# Patient Record
Sex: MALE | Race: WHITE | Employment: FULL TIME | URBAN - METROPOLITAN AREA
[De-identification: names, ages, dates, MRNs, and addresses within clinical notes are randomized per-mention and may not be internally consistent; named-entity substitution may affect disease eponyms.]

---

## 2017-03-11 ENCOUNTER — HOSPITAL ENCOUNTER (EMERGENCY)
Age: 27
Discharge: HOME | End: 2017-03-11
Attending: EMERGENCY MEDICINE
Payer: COMMERCIAL

## 2017-03-11 VITALS
HEART RATE: 69 BPM | DIASTOLIC BLOOD PRESSURE: 68 MMHG | WEIGHT: 145 LBS | HEIGHT: 72 IN | RESPIRATION RATE: 16 BRPM | TEMPERATURE: 98.1 F | SYSTOLIC BLOOD PRESSURE: 115 MMHG | BODY MASS INDEX: 19.64 KG/M2 | OXYGEN SATURATION: 98 %

## 2017-03-11 DIAGNOSIS — H92.21 BLOOD IN RIGHT EAR CANAL: Primary | ICD-10-CM

## 2017-03-11 DIAGNOSIS — H92.03 EAR PAIN, BILATERAL: ICD-10-CM

## 2017-03-11 DIAGNOSIS — F17.200 SMOKING ADDICTION: ICD-10-CM

## 2017-03-11 PROCEDURE — 6370000000 HC RX 637 (ALT 250 FOR IP): Performed by: EMERGENCY MEDICINE

## 2017-03-11 PROCEDURE — 99283 EMERGENCY DEPT VISIT LOW MDM: CPT

## 2017-03-11 RX ORDER — NAPROXEN 250 MG/1
500 TABLET ORAL ONCE
Status: COMPLETED | OUTPATIENT
Start: 2017-03-11 | End: 2017-03-11

## 2017-03-11 RX ORDER — NAPROXEN 500 MG/1
500 TABLET ORAL 2 TIMES DAILY WITH MEALS
Qty: 60 TABLET | Refills: 0 | Status: SHIPPED | OUTPATIENT
Start: 2017-03-11 | End: 2018-02-08 | Stop reason: ALTCHOICE

## 2017-03-11 RX ORDER — NEOMYCIN SULFATE, POLYMYXIN B SULFATE AND HYDROCORTISONE 10; 3.5; 1 MG/ML; MG/ML; [USP'U]/ML
3 SUSPENSION/ DROPS AURICULAR (OTIC) EVERY 6 HOURS SCHEDULED
Status: DISCONTINUED | OUTPATIENT
Start: 2017-03-11 | End: 2017-03-11 | Stop reason: HOSPADM

## 2017-03-11 RX ADMIN — NAPROXEN 500 MG: 250 TABLET ORAL at 04:31

## 2017-03-11 RX ADMIN — NEOMYCIN SULFATE, POLYMYXIN B SULFATE AND HYDROCORTISONE 3 DROP: 10; 3.5; 1 SUSPENSION/ DROPS AURICULAR (OTIC) at 04:31

## 2017-03-11 ASSESSMENT — PAIN DESCRIPTION - PAIN TYPE: TYPE: ACUTE PAIN

## 2017-03-11 ASSESSMENT — PAIN SCALES - GENERAL
PAINLEVEL_OUTOF10: 6
PAINLEVEL_OUTOF10: 6

## 2017-03-11 ASSESSMENT — ENCOUNTER SYMPTOMS
ABDOMINAL PAIN: 0
BACK PAIN: 0
VOMITING: 0
SORE THROAT: 0
SHORTNESS OF BREATH: 0
NAUSEA: 0
DIARRHEA: 0
EYE PAIN: 0
COUGH: 0

## 2017-03-11 ASSESSMENT — PAIN DESCRIPTION - LOCATION: LOCATION: EAR

## 2017-03-11 ASSESSMENT — PAIN DESCRIPTION - ORIENTATION: ORIENTATION: RIGHT

## 2017-03-11 ASSESSMENT — PAIN DESCRIPTION - DESCRIPTORS: DESCRIPTORS: SHARP

## 2018-02-08 ENCOUNTER — HOSPITAL ENCOUNTER (EMERGENCY)
Age: 28
Discharge: HOME | End: 2018-02-08
Attending: EMERGENCY MEDICINE
Payer: COMMERCIAL

## 2018-02-08 VITALS
HEIGHT: 72 IN | BODY MASS INDEX: 21.4 KG/M2 | SYSTOLIC BLOOD PRESSURE: 122 MMHG | DIASTOLIC BLOOD PRESSURE: 71 MMHG | OXYGEN SATURATION: 100 % | RESPIRATION RATE: 14 BRPM | WEIGHT: 158 LBS | TEMPERATURE: 97.8 F | HEART RATE: 75 BPM

## 2018-02-08 DIAGNOSIS — M54.32 SCIATICA OF LEFT SIDE: Primary | ICD-10-CM

## 2018-02-08 DIAGNOSIS — S39.012A STRAIN OF LUMBAR REGION, INITIAL ENCOUNTER: ICD-10-CM

## 2018-02-08 PROCEDURE — 6370000000 HC RX 637 (ALT 250 FOR IP): Performed by: PHYSICIAN ASSISTANT

## 2018-02-08 PROCEDURE — 99283 EMERGENCY DEPT VISIT LOW MDM: CPT

## 2018-02-08 RX ORDER — CYCLOBENZAPRINE HCL 10 MG
10 TABLET ORAL 2 TIMES DAILY PRN
Qty: 10 TABLET | Refills: 0 | Status: SHIPPED | OUTPATIENT
Start: 2018-02-08 | End: 2018-02-13

## 2018-02-08 RX ORDER — IBUPROFEN 600 MG/1
600 TABLET ORAL EVERY 6 HOURS PRN
Qty: 30 TABLET | Refills: 0 | Status: SHIPPED | OUTPATIENT
Start: 2018-02-08 | End: 2018-03-06 | Stop reason: ALTCHOICE

## 2018-02-08 RX ORDER — IBUPROFEN 600 MG/1
600 TABLET ORAL ONCE
Status: COMPLETED | OUTPATIENT
Start: 2018-02-08 | End: 2018-02-08

## 2018-02-08 RX ADMIN — IBUPROFEN 600 MG: 600 TABLET ORAL at 13:41

## 2018-02-08 ASSESSMENT — PAIN SCALES - GENERAL
PAINLEVEL_OUTOF10: 7
PAINLEVEL_OUTOF10: 7

## 2018-02-08 ASSESSMENT — PAIN DESCRIPTION - ORIENTATION: ORIENTATION: LEFT

## 2018-02-08 ASSESSMENT — ENCOUNTER SYMPTOMS
BACK PAIN: 1
BOWEL INCONTINENCE: 0
ABDOMINAL SWELLING: 0
ABDOMINAL PAIN: 0

## 2018-02-08 ASSESSMENT — PAIN DESCRIPTION - PAIN TYPE: TYPE: ACUTE PAIN

## 2018-02-08 ASSESSMENT — PAIN DESCRIPTION - LOCATION: LOCATION: BACK

## 2018-03-06 ENCOUNTER — HOSPITAL ENCOUNTER (EMERGENCY)
Age: 28
Discharge: HOME | End: 2018-03-06
Attending: EMERGENCY MEDICINE
Payer: COMMERCIAL

## 2018-03-06 VITALS
TEMPERATURE: 98.1 F | DIASTOLIC BLOOD PRESSURE: 74 MMHG | HEART RATE: 52 BPM | SYSTOLIC BLOOD PRESSURE: 116 MMHG | RESPIRATION RATE: 14 BRPM | HEIGHT: 72 IN | BODY MASS INDEX: 22.35 KG/M2 | OXYGEN SATURATION: 97 % | WEIGHT: 165 LBS

## 2018-03-06 DIAGNOSIS — B34.9 VIRAL SYNDROME: Primary | ICD-10-CM

## 2018-03-06 LAB
ABSOLUTE EOS #: 0.08 K/UL (ref 0–0.4)
ABSOLUTE IMMATURE GRANULOCYTE: ABNORMAL K/UL (ref 0–0.3)
ABSOLUTE LYMPH #: 1.54 K/UL (ref 1–4.8)
ABSOLUTE MONO #: 0.54 K/UL (ref 0.1–1.3)
ANION GAP SERPL CALCULATED.3IONS-SCNC: 13 MMOL/L (ref 9–17)
BASOPHILS # BLD: 0 % (ref 0–2)
BASOPHILS ABSOLUTE: 0 K/UL (ref 0–0.2)
BUN BLDV-MCNC: 12 MG/DL (ref 6–20)
BUN/CREAT BLD: ABNORMAL (ref 9–20)
CALCIUM SERPL-MCNC: 9.8 MG/DL (ref 8.6–10.4)
CHLORIDE BLD-SCNC: 104 MMOL/L (ref 98–107)
CO2: 23 MMOL/L (ref 20–31)
CREAT SERPL-MCNC: 1.03 MG/DL (ref 0.7–1.2)
DIFFERENTIAL TYPE: ABNORMAL
DIRECT EXAM: NORMAL
EOSINOPHILS RELATIVE PERCENT: 1 % (ref 0–4)
GFR AFRICAN AMERICAN: >60 ML/MIN
GFR NON-AFRICAN AMERICAN: >60 ML/MIN
GFR SERPL CREATININE-BSD FRML MDRD: ABNORMAL ML/MIN/{1.73_M2}
GFR SERPL CREATININE-BSD FRML MDRD: ABNORMAL ML/MIN/{1.73_M2}
GLUCOSE BLD-MCNC: 103 MG/DL (ref 70–99)
HCT VFR BLD CALC: 46.4 % (ref 41–53)
HEMOGLOBIN: 16.1 G/DL (ref 13.5–17.5)
IMMATURE GRANULOCYTES: ABNORMAL %
LYMPHOCYTES # BLD: 20 % (ref 24–44)
Lab: NORMAL
MCH RBC QN AUTO: 32.3 PG (ref 26–34)
MCHC RBC AUTO-ENTMCNC: 34.7 G/DL (ref 31–37)
MCV RBC AUTO: 93 FL (ref 80–100)
MONOCYTES # BLD: 7 % (ref 1–7)
MORPHOLOGY: NORMAL
NRBC AUTOMATED: ABNORMAL PER 100 WBC
PDW BLD-RTO: 13 % (ref 11.5–14.9)
PLATELET # BLD: 210 K/UL (ref 150–450)
PLATELET ESTIMATE: ABNORMAL
PMV BLD AUTO: 8.9 FL (ref 6–12)
POTASSIUM SERPL-SCNC: 4.1 MMOL/L (ref 3.7–5.3)
RBC # BLD: 4.99 M/UL (ref 4.5–5.9)
RBC # BLD: ABNORMAL 10*6/UL
SEG NEUTROPHILS: 72 % (ref 36–66)
SEGMENTED NEUTROPHILS ABSOLUTE COUNT: 5.54 K/UL (ref 1.3–9.1)
SODIUM BLD-SCNC: 140 MMOL/L (ref 135–144)
SPECIMEN DESCRIPTION: NORMAL
SPECIMEN DESCRIPTION: NORMAL
STATUS: NORMAL
WBC # BLD: 7.7 K/UL (ref 3.5–11)
WBC # BLD: ABNORMAL 10*3/UL

## 2018-03-06 PROCEDURE — 80048 BASIC METABOLIC PNL TOTAL CA: CPT

## 2018-03-06 PROCEDURE — 2580000003 HC RX 258: Performed by: EMERGENCY MEDICINE

## 2018-03-06 PROCEDURE — 6360000002 HC RX W HCPCS: Performed by: EMERGENCY MEDICINE

## 2018-03-06 PROCEDURE — 96374 THER/PROPH/DIAG INJ IV PUSH: CPT

## 2018-03-06 PROCEDURE — 85025 COMPLETE CBC W/AUTO DIFF WBC: CPT

## 2018-03-06 PROCEDURE — 99284 EMERGENCY DEPT VISIT MOD MDM: CPT

## 2018-03-06 PROCEDURE — 87804 INFLUENZA ASSAY W/OPTIC: CPT

## 2018-03-06 PROCEDURE — 36415 COLL VENOUS BLD VENIPUNCTURE: CPT

## 2018-03-06 RX ORDER — 0.9 % SODIUM CHLORIDE 0.9 %
1000 INTRAVENOUS SOLUTION INTRAVENOUS ONCE
Status: COMPLETED | OUTPATIENT
Start: 2018-03-06 | End: 2018-03-06

## 2018-03-06 RX ORDER — IBUPROFEN 600 MG/1
600 TABLET ORAL EVERY 6 HOURS PRN
Qty: 40 TABLET | Refills: 0 | Status: SHIPPED | OUTPATIENT
Start: 2018-03-06 | End: 2018-07-29 | Stop reason: DRUGHIGH

## 2018-03-06 RX ORDER — KETOROLAC TROMETHAMINE 30 MG/ML
30 INJECTION, SOLUTION INTRAMUSCULAR; INTRAVENOUS ONCE
Status: COMPLETED | OUTPATIENT
Start: 2018-03-06 | End: 2018-03-06

## 2018-03-06 RX ADMIN — SODIUM CHLORIDE 1000 ML: 9 INJECTION, SOLUTION INTRAVENOUS at 10:30

## 2018-03-06 RX ADMIN — KETOROLAC TROMETHAMINE 30 MG: 30 INJECTION, SOLUTION INTRAMUSCULAR; INTRAVENOUS at 10:30

## 2018-03-06 ASSESSMENT — PAIN DESCRIPTION - ONSET: ONSET: SUDDEN

## 2018-03-06 ASSESSMENT — PAIN SCALES - GENERAL
PAINLEVEL_OUTOF10: 5
PAINLEVEL_OUTOF10: 3

## 2018-03-06 ASSESSMENT — PAIN DESCRIPTION - LOCATION: LOCATION: GENERALIZED

## 2018-03-06 ASSESSMENT — PAIN DESCRIPTION - DESCRIPTORS: DESCRIPTORS: ACHING

## 2018-03-06 NOTE — ED PROVIDER NOTES
[address]  eMERGENCY dEPARTMENT eNCOUnter    Pt Name: Emily Yepez  MRN: [id number]  [place] 1990  Date of evaluation: 3/6/18  CHIEF COMPLAINT       Chief Complaint   Patient presents with    Emesis    Chills    Headache    Generalized Body Aches     HISTORY OF PRESENT ILLNESS   HPI  18-year-old male with past medical history as listed below presents the ER with multiple complaints. Patient states earlier today he felt warm and had chills followed by body aches and a mild throbbing headache. Patient states he threw up twice. Vomitus described as nonbilious nonbloody. Patient denies chest pain, rash, neck pain, cough or runny nose. He positive for sick contacts. REVIEW OF SYSTEMS     Review of Systems   All other systems reviewed and are negative. PAST MEDICAL HISTORY     Past Medical History:   Diagnosis Date    Headache(784.0)     Syncope      SURGICAL HISTORY       Past Surgical History:   Procedure Laterality Date    APPENDECTOMY      HERNIA REPAIR       CURRENT MEDICATIONS       Discharge Medication List as of 3/6/2018 11:42 AM        ALLERGIES     has No Known Allergies. FAMILY HISTORY     indicated that the status of his mother is unknown. He indicated that the status of his father is unknown. He indicated that the status of his maternal grandmother is unknown. He indicated that the status of his maternal grandfather is unknown. He indicated that the status of his paternal grandmother is unknown. He indicated that the status of his paternal grandfather is unknown. SOCIAL HISTORY      reports that he has been smoking. He has a 7.50 pack-year smoking history. He has never used smokeless tobacco. He reports that he drinks alcohol. He reports that he uses drugs, including Marijuana.   PHYSICAL EXAM     INITIAL VITALS: /74   Pulse 52   Temp 98.1 °F (36.7 °C) (Oral)   Resp 14   Ht 6' (1.829 m)   Wt 165 lb (74.8 kg)   SpO2 97%   BMI 22.38 kg/m²    Physical Exam

## 2018-07-29 ENCOUNTER — HOSPITAL ENCOUNTER (EMERGENCY)
Age: 28
Discharge: HOME | End: 2018-07-29
Attending: EMERGENCY MEDICINE
Payer: COMMERCIAL

## 2018-07-29 VITALS
WEIGHT: 160 LBS | TEMPERATURE: 97.5 F | DIASTOLIC BLOOD PRESSURE: 71 MMHG | OXYGEN SATURATION: 99 % | RESPIRATION RATE: 14 BRPM | HEART RATE: 87 BPM | HEIGHT: 72 IN | BODY MASS INDEX: 21.67 KG/M2 | SYSTOLIC BLOOD PRESSURE: 103 MMHG

## 2018-07-29 DIAGNOSIS — M54.2 NECK PAIN: Primary | ICD-10-CM

## 2018-07-29 DIAGNOSIS — K08.89 PAIN, DENTAL: ICD-10-CM

## 2018-07-29 PROCEDURE — 6370000000 HC RX 637 (ALT 250 FOR IP): Performed by: EMERGENCY MEDICINE

## 2018-07-29 PROCEDURE — 99282 EMERGENCY DEPT VISIT SF MDM: CPT

## 2018-07-29 RX ORDER — IBUPROFEN 600 MG/1
600 TABLET ORAL EVERY 6 HOURS PRN
Qty: 30 TABLET | Refills: 0 | Status: SHIPPED | OUTPATIENT
Start: 2018-07-29

## 2018-07-29 RX ORDER — DIAZEPAM 5 MG/1
5 TABLET ORAL EVERY 8 HOURS PRN
Qty: 10 TABLET | Refills: 0 | Status: SHIPPED | OUTPATIENT
Start: 2018-07-29 | End: 2018-07-29 | Stop reason: DRUGHIGH

## 2018-07-29 RX ORDER — IBUPROFEN 800 MG/1
800 TABLET ORAL ONCE
Status: COMPLETED | OUTPATIENT
Start: 2018-07-29 | End: 2018-07-29

## 2018-07-29 RX ORDER — PENICILLIN V POTASSIUM 250 MG/1
500 TABLET ORAL ONCE
Status: COMPLETED | OUTPATIENT
Start: 2018-07-29 | End: 2018-07-29

## 2018-07-29 RX ORDER — PENICILLIN V POTASSIUM 500 MG/1
500 TABLET ORAL 4 TIMES DAILY
Qty: 28 TABLET | Refills: 0 | Status: SHIPPED | OUTPATIENT
Start: 2018-07-29 | End: 2018-08-05

## 2018-07-29 RX ORDER — CHLORHEXIDINE GLUCONATE 0.12 MG/ML
15 RINSE ORAL 2 TIMES DAILY
Qty: 420 ML | Refills: 0 | Status: SHIPPED | OUTPATIENT
Start: 2018-07-29 | End: 2018-08-12

## 2018-07-29 RX ORDER — ACETAMINOPHEN 325 MG/1
650 TABLET ORAL EVERY 8 HOURS PRN
Qty: 30 TABLET | Refills: 0 | Status: SHIPPED | OUTPATIENT
Start: 2018-07-29

## 2018-07-29 RX ORDER — ACETAMINOPHEN 325 MG/1
650 TABLET ORAL ONCE
Status: COMPLETED | OUTPATIENT
Start: 2018-07-29 | End: 2018-07-29

## 2018-07-29 RX ADMIN — PENICILLIN V POTASIUM 500 MG: 250 TABLET ORAL at 17:45

## 2018-07-29 RX ADMIN — ACETAMINOPHEN 650 MG: 325 TABLET ORAL at 16:55

## 2018-07-29 RX ADMIN — IBUPROFEN 800 MG: 800 TABLET, FILM COATED ORAL at 16:55

## 2018-07-29 ASSESSMENT — PAIN SCALES - GENERAL: PAINLEVEL_OUTOF10: 10

## 2018-07-29 NOTE — ED PROVIDER NOTES
I performed a history and physical examination of the patient and discussed management with the resident. I reviewed the residents note and agree with the documented findings and plan of care. Any areas of disagreement are noted on the chart. I was personally present for the key portions of any procedures. I have documented in the chart those procedures where I was not present during the key portions. I have reviewed the emergency nurses triage note. I agree with the chief complaint, past medical history, past surgical history, allergies, medications, social and family history as documented unless otherwise noted below. Documentation of the HPI, Physical Exam and Medical Decision Making performed by medical students or scribes is based on my personal performance of the HPI, PE and MDM. For Phys Assistant/ Nurse Practitioner cases/documentation I have personally evaluated this patient and have completed at least one if not all key elements of the E/M (history, physical exam, and MDM). I find the patient's history and physical exam are consistent with the NP/PA documentation. I agree with the care provided, treatment rendered, disposition and followup plan. Additional findings are as noted. Macye Couch. Claudia Israel MD  Attending Emergency  Physician      C/O SEV DAY HX OF LEFT SIDED NECK PAIN, JAW PAIN, RADIATION TO LEFT EAR, WORSE WITH CHEWING, /OPENING/CLOSING MOUTH. NO FEVER, CHILLS, TRAUMA, DIZZINESS/VERTIGO, DIST HEARING, HEADACHE. AWAKE, ALERT, COOP, RESPONSIVE. NECK-TENDER, MILDLY ENLARGED LEFT SUBMANDIBULAR NODE. NO ERYTHEMA, WARMTH. EAC'S/TM'S NORMAL SERENITY. ORALCAV-EXTREMELY CARIOUS, TENDER LEFT MAXILLARY SECOND MOLAR. MILD GINGIVAL SWELLING AT BASE OF TOOTH. NO SWELLING, FLUCTUANCE, POINTING. IMP-DENTAL CARIES, PROB EARLY PERIAPICAL ABSCESS. PLAN-DISCHARGE, RX PCN, IBUPROFEN. F/U WITH YOUR DENTIST ASAP-CALL IN AM. RETURN IF SX WORSEN OR PROGRESS.           Marguerite Cardoso MD  07/29/18 3646

## 2018-07-29 NOTE — ED NOTES
Pt to ed c/o left sided ear pain, left sided facial pain & left sided neck pain. Pt states this started yesterday but didn't think much of it. However, today the pain has gotten progressively worse where it hurts to talk, eat, and smoke. Pt is alert and orientedx3, will continue to monitor.       Brittany Hernandez RN  07/29/18 9919

## 2018-07-29 NOTE — ED PROVIDER NOTES
Mother     Anxiety Disorder Father     Diabetes Maternal Grandmother     Diabetes Maternal Grandfather     Heart Disease Maternal Grandfather     Arthritis Maternal Grandfather     Diabetes Paternal Grandmother     Diabetes Paternal Grandfather        Allergies:  Patient has no known allergies. Home Medications:  Prior to Admission medications    Medication Sig Start Date End Date Taking? Authorizing Provider   acetaminophen (TYLENOL) 325 MG tablet Take 2 tablets by mouth every 8 hours as needed for Pain 7/29/18  Yes Tiffanie Meyer MD   ibuprofen (ADVIL;MOTRIN) 600 MG tablet Take 1 tablet by mouth every 6 hours as needed for Pain 7/29/18  Yes Tiffanie Meyer MD   penicillin v potassium (VEETID) 500 MG tablet Take 1 tablet by mouth 4 times daily for 7 days 7/29/18 8/5/18 Yes Tiffanie Meyer MD   chlorhexidine (PERIDEX) 0.12 % solution Take 15 mLs by mouth 2 times daily for 14 days 7/29/18 8/12/18 Yes Tiffanie Meyer MD       REVIEW OF SYSTEMS    (2-9 systems for level 4, 10 or more for level 5)        ROS:  Constitutional: Denied fever/chills, weight loss  Eyes: Denied change in vision, eye pain  ENT: Positive sinus problems, congestion/obstruction  GI: Denies nausea, vomiting,    Neuro: Denies weekness and seizures    PHYSICAL EXAM   (up to 7 for level 4, 8 or more for level 5)      INITIAL VITALS:   /71   Pulse 87   Temp 97.5 °F (36.4 °C) (Oral)   Resp 14   Ht 6' (1.829 m)   Wt 160 lb (72.6 kg)   SpO2 99%   BMI 21.70 kg/m²     Vital signs reviewed. Nursing note reviewed    CONSTITUTIONAL: Vital signs reviewed, Alert and oriented X 3. No acute distress  HEAD: Atraumatic, Normocephalic. TM Clear bilaterally, mastoid non tender and without bulging ears there is ttp of a left LAD which is <1 cm submandibular  EYES: Eyes are normal to inspection, Pupils equal, round and reactive to light. NECK: Normal ROM, No jugular venous distention, No meningeal signs, neck supple, Cervical spine nontender. 420 mL     Refill:  0    penicillin v potassium (VEETID) tablet 500 mg       DDX: dental cavitis, tooth fracture or fracture, tooth abscess, epiglottitis, kit's angina, retropharyngeal abscess/cellulitis, parapharyngeal abscess, peritonsillar abscess, mononucleosis, carotid dissection/aneurysm, alveolar osteitis (dry socket), pharyngitis, URI, foreign body aspiration or ingestion, trauma, post-extraction pain, TMJ pain, aphthous ulcer, herpes, acute necrotizing ulcerative gingivitis      DIAGNOSTIC RESULTS / EMERGENCY DEPARTMENT COURSE / MDM     IMPRESSION: Dental pain. EMERGENCY DEPARTMENT COURSE:  Control pain  Antibiotics   Dental Follow up with his dentist  3 min. smoking cessation given to the patient      FINAL IMPRESSION      1. Neck pain    2. Pain, dental          Pain meds, chlorhexidine rinses and antibiotic prescriptions via pcn 500 mg q 8 hrs   Patient presents with complaints of dental pain. At this time there is no dental abscesses to drain. There is multiple dental caries. Patient has been tolerating secretions. Patient has been speaking full sentences. Patient is nontoxic looking and can be safely discharged home. Low Suspicion for retropharyngeal abscess, Kit angina, peritonsillar abscess. Patient is currently afebrile  Instructions as noted below  DISPOSITION / PLAN     DISPOSITION Decision To Discharge 07/29/2018 05:16:09 PM      Call today or tomorrow for a follow up with your dentist tomorrow, [address] [facility] or [facility] [place] at [phone] or one of the dentists provided for follow up. Take your medication as indicated, if you are given an antibiotic then make sure you get the prescription filled and take the antibiotics until finished. Drink plenty of water while taking the antibiotics. Avoid drinking alcohol while taking antibiotics.      Sarah Phelps has some antibiotics for free; [facility] and Tommie Klinefelter has a 4 dollar

## 2020-01-10 ENCOUNTER — HOSPITAL ENCOUNTER (EMERGENCY)
Age: 30
Discharge: HOME | End: 2020-01-10
Attending: EMERGENCY MEDICINE
Payer: COMMERCIAL

## 2020-01-10 VITALS
RESPIRATION RATE: 14 BRPM | DIASTOLIC BLOOD PRESSURE: 71 MMHG | WEIGHT: 157 LBS | HEIGHT: 72 IN | HEART RATE: 88 BPM | BODY MASS INDEX: 21.26 KG/M2 | OXYGEN SATURATION: 98 % | TEMPERATURE: 97.9 F | SYSTOLIC BLOOD PRESSURE: 114 MMHG

## 2020-01-10 PROCEDURE — 99281 EMR DPT VST MAYX REQ PHY/QHP: CPT

## 2020-01-10 PROCEDURE — 6370000000 HC RX 637 (ALT 250 FOR IP): Performed by: PHYSICIAN ASSISTANT

## 2020-01-10 RX ORDER — ONDANSETRON 4 MG/1
4 TABLET, ORALLY DISINTEGRATING ORAL EVERY 8 HOURS PRN
Qty: 20 TABLET | Refills: 0 | Status: SHIPPED | OUTPATIENT
Start: 2020-01-10

## 2020-01-10 RX ORDER — ONDANSETRON 4 MG/1
4 TABLET, ORALLY DISINTEGRATING ORAL ONCE
Status: COMPLETED | OUTPATIENT
Start: 2020-01-10 | End: 2020-01-10

## 2020-01-10 RX ORDER — DICYCLOMINE HYDROCHLORIDE 10 MG/1
10 CAPSULE ORAL EVERY 6 HOURS PRN
Qty: 20 CAPSULE | Refills: 0 | Status: SHIPPED | OUTPATIENT
Start: 2020-01-10

## 2020-01-10 RX ADMIN — ONDANSETRON 4 MG: 4 TABLET, ORALLY DISINTEGRATING ORAL at 17:57

## 2020-01-10 NOTE — ED PROVIDER NOTES
[address]  eMERGENCY dEPARTMENT eNCOUnter   Independent Attestation     Pt Name: Marianne Castillo  MRN: [id number]  [place] 1990  Date of evaluation: 1/10/20   Marianne Castillo is a 30 y.o. male who presents with Letter for School/Work    Vitals:   Vitals:    01/10/20 1738   BP: 114/71   Pulse: 88   Resp: 14   Temp: 97.9 °F (36.6 °C)   TempSrc: Temporal   SpO2: 98%   Weight: 157 lb (71.2 kg)   Height: 6' (1.829 m)     Impression:   1. Gastroenteritis      I was personally available for consultation in the Emergency Department. I have reviewed the chart and agree with the documentation as recorded by the [facility], including the assessment, treatment plan and disposition.   Sol Rogers MD  Attending Emergency  Physician                  Sol Rogers MD  01/10/20 1817       Sol Rogers MD  01/11/20 1550
capsule by mouth every 6 hours as needed (cramps)     Dispense:  20 capsule     Refill:  0       CONSULTS:  None      FINAL IMPRESSION      1.  Gastroenteritis          DISPOSITION/PLAN:  DISPOSITION Decision To Discharge      PATIENT REFERRED TO:  your doctor or clinic list    Schedule an appointment as soon as possible for a visit       [facility] ED  [address]  [phone]    For worsening symptoms, or any other concern      DISCHARGE MEDICATIONS:  Discharge Medication List as of 1/10/2020  6:16 PM      START taking these medications    Details   ondansetron (ZOFRAN ODT) 4 MG disintegrating tablet Take 1 tablet by mouth every 8 hours as needed for Nausea, Disp-20 tablet, R-0Print      dicyclomine (BENTYL) 10 MG capsule Take 1 capsule by mouth every 6 hours as needed (cramps), Disp-20 capsule, R-0Print             (Please note that portions of this note were completed with a voice recognition program.  Efforts were made to edit the dictations but occasionally words are mis-transcribed.)    Ivy Harman, [address] [place] [address]  01/10/20 0354

## 2020-02-09 ENCOUNTER — HOSPITAL ENCOUNTER (EMERGENCY)
Age: 30
Discharge: HOME | End: 2020-02-09
Attending: EMERGENCY MEDICINE
Payer: COMMERCIAL

## 2020-02-09 ENCOUNTER — APPOINTMENT (OUTPATIENT)
Dept: GENERAL RADIOLOGY | Age: 30
End: 2020-02-09
Payer: COMMERCIAL

## 2020-02-09 VITALS
HEIGHT: 72 IN | RESPIRATION RATE: 14 BRPM | WEIGHT: 155 LBS | SYSTOLIC BLOOD PRESSURE: 137 MMHG | DIASTOLIC BLOOD PRESSURE: 90 MMHG | HEART RATE: 84 BPM | OXYGEN SATURATION: 99 % | BODY MASS INDEX: 20.99 KG/M2 | TEMPERATURE: 98.4 F

## 2020-02-09 PROCEDURE — 73110 X-RAY EXAM OF WRIST: CPT

## 2020-02-09 PROCEDURE — 6370000000 HC RX 637 (ALT 250 FOR IP): Performed by: PHYSICIAN ASSISTANT

## 2020-02-09 PROCEDURE — 99283 EMERGENCY DEPT VISIT LOW MDM: CPT

## 2020-02-09 RX ORDER — IBUPROFEN 600 MG/1
600 TABLET ORAL ONCE
Status: COMPLETED | OUTPATIENT
Start: 2020-02-09 | End: 2020-02-09

## 2020-02-09 RX ORDER — HYDROCODONE BITARTRATE AND ACETAMINOPHEN 5; 325 MG/1; MG/1
1 TABLET ORAL ONCE
Status: COMPLETED | OUTPATIENT
Start: 2020-02-09 | End: 2020-02-09

## 2020-02-09 RX ORDER — IBUPROFEN 600 MG/1
600 TABLET ORAL EVERY 6 HOURS PRN
Qty: 30 TABLET | Refills: 0 | Status: SHIPPED | OUTPATIENT
Start: 2020-02-09

## 2020-02-09 RX ADMIN — IBUPROFEN 600 MG: 600 TABLET, FILM COATED ORAL at 18:57

## 2020-02-09 RX ADMIN — HYDROCODONE BITARTRATE AND ACETAMINOPHEN 1 TABLET: 5; 325 TABLET ORAL at 20:11

## 2020-02-09 ASSESSMENT — PAIN DESCRIPTION - ORIENTATION: ORIENTATION: RIGHT

## 2020-02-09 ASSESSMENT — PAIN DESCRIPTION - LOCATION: LOCATION: WRIST

## 2020-02-09 ASSESSMENT — PAIN SCALES - GENERAL
PAINLEVEL_OUTOF10: 6

## 2020-02-09 ASSESSMENT — PAIN DESCRIPTION - DESCRIPTORS: DESCRIPTORS: ACHING

## 2020-02-09 ASSESSMENT — PAIN DESCRIPTION - FREQUENCY: FREQUENCY: CONTINUOUS

## 2020-02-09 ASSESSMENT — PAIN DESCRIPTION - PAIN TYPE: TYPE: ACUTE PAIN

## 2020-02-09 NOTE — ED PROVIDER NOTES
[address]  eMERGENCY dEPARTMENT eNCOUnter      Pt Name: Angie Granados  MRN: [id number]  [place] 1990  Date of evaluation: 2/9/20      CHIEF COMPLAINT       Chief Complaint   Patient presents with    Wrist Pain     Friday morning awoke to pain         HISTORY OF PRESENT ILLNESS    Angie Granados is a 30 y.o. male who presents complaining of right wrist pain woke up with it Friday. The history is provided by the patient. Wrist Problem   Location:  Wrist (He woke up with pain in the right wrist no recent injury or trauma but he did have a scaphoid fracture in the past that he did not take care of properly he states he cut the cast off before the wrist was healed.)  Wrist location:  R wrist  Injury: no    Pain details:     Quality:  Aching    Radiates to:  Does not radiate    Severity:  Mild    Onset quality:  Sudden    Duration:  2 days    Timing:  Intermittent  Dislocation: no    Foreign body present:  No foreign bodies  Tetanus status:  Unknown  Prior injury to area:  Yes  Relieved by:  Nothing  Worsened by: Movement      REVIEW OF SYSTEMS       Review of Systems   Musculoskeletal: Positive for arthralgias (right medial wrist pain no recent trauma). All other systems reviewed and are negative.       PAST MEDICAL HISTORY     Past Medical History:   Diagnosis Date    Headache(784.0)     Syncope        SURGICAL HISTORY       Past Surgical History:   Procedure Laterality Date    APPENDECTOMY      HERNIA REPAIR         CURRENT MEDICATIONS       Discharge Medication List as of 2/9/2020  8:08 PM      CONTINUE these medications which have NOT CHANGED    Details   ondansetron (ZOFRAN ODT) 4 MG disintegrating tablet Take 1 tablet by mouth every 8 hours as needed for Nausea, Disp-20 tablet, R-0Print      dicyclomine (BENTYL) 10 MG capsule Take 1 capsule by mouth every 6 hours as needed (cramps), Disp-20 capsule, R-0Print      acetaminophen (TYLENOL) 325 MG tablet Take 2 tablets by mouth every 8 hours as

## 2020-02-09 NOTE — ED PROVIDER NOTES
[address]  Emergency Department  Independent Attestation     Pt Name: Minor Taveras  MRN: [id number]  [place] 1990  Date of evaluation: 2/9/20       Minor Taveras is a 30 y.o. male who presents with Wrist Pain (Friday morning awoke to pain)      I was personally available for consultation in the Emergency Department.     Regina Moraes DO  Attending Emergency Physician  [address] [facility] ED      (Please note that portions of this note were completed with a voice recognition program.  Efforts were made to edit the dictations but occasionally words are mis-transcribed.)        Regina Moraes DO  02/09/20 [place]

## 2020-02-09 NOTE — ED NOTES
Mode of arrival (squad #, walk in, police, etc) : walk in        Chief complaint(s): wrist pain- right        Arrival Note (brief scenario, treatment PTA, etc). : Pt reports injuring rt wrist as a child. Intermittent pain rebecca when weather changes. PMS intact distal to pain. Palpable radial pulse. C= \"Have you ever felt that you should Cut down on your drinking? \"  No  A= \"Have people Annoyed you by criticizing your drinking? \"  No  G= \"Have you ever felt bad or Guilty about your drinking? \"  No  E= \"Have you ever had a drink as an Eye-opener first thing in the morning to steady your nerves or to help a hangover? \"  No      Deferred []      Reason for deferring: N/A    *If yes to two or more: probable alcohol abuse. Deneise Soulier, RN  02/09/20 0009

## 2020-02-13 ENCOUNTER — OFFICE VISIT (OUTPATIENT)
Dept: ORTHOPEDIC SURGERY | Age: 30
End: 2020-02-13
Payer: COMMERCIAL

## 2020-02-13 PROCEDURE — G8484 FLU IMMUNIZE NO ADMIN: HCPCS | Performed by: PHYSICIAN ASSISTANT

## 2020-02-13 PROCEDURE — 4004F PT TOBACCO SCREEN RCVD TLK: CPT | Performed by: PHYSICIAN ASSISTANT

## 2020-02-13 PROCEDURE — G8427 DOCREV CUR MEDS BY ELIG CLIN: HCPCS | Performed by: PHYSICIAN ASSISTANT

## 2020-02-13 PROCEDURE — G8420 CALC BMI NORM PARAMETERS: HCPCS | Performed by: PHYSICIAN ASSISTANT

## 2020-02-13 PROCEDURE — 99203 OFFICE O/P NEW LOW 30 MIN: CPT | Performed by: PHYSICIAN ASSISTANT

## 2020-02-13 ASSESSMENT — ENCOUNTER SYMPTOMS
VOMITING: 0
COLOR CHANGE: 0
NAUSEA: 0

## 2020-02-13 NOTE — PROGRESS NOTES
WO CONTRAST     Standing Status:   Future     Standing Expiration Date:   2/13/2021    Amb External Referral To Orthopedic Surgery     Referral Priority:   Routine     Referral Type:   Consult for Advice and Opinion     Referral Reason:   Specialty Services Required     Referred to Provider:   Eamon Garrett MD     Requested Specialty:   Orthopedic Surgery     Number of Visits Requested:   [address] [facility], [address] [facility]      Please note that this chart was generated using voicerecognition Dragon dictation software. Although every effort was made to ensurethe accuracy of this automated transcription, some errors in transcription may haveoccurred.

## 2022-03-03 ENCOUNTER — APPOINTMENT (OUTPATIENT)
Dept: GENERAL RADIOLOGY | Age: 32
End: 2022-03-03
Payer: COMMERCIAL

## 2022-03-03 ENCOUNTER — HOSPITAL ENCOUNTER (EMERGENCY)
Age: 32
Discharge: HOME | End: 2022-03-03
Attending: EMERGENCY MEDICINE
Payer: COMMERCIAL

## 2022-03-03 VITALS
OXYGEN SATURATION: 98 % | BODY MASS INDEX: 21.67 KG/M2 | RESPIRATION RATE: 16 BRPM | SYSTOLIC BLOOD PRESSURE: 110 MMHG | HEIGHT: 72 IN | WEIGHT: 160 LBS | HEART RATE: 70 BPM | DIASTOLIC BLOOD PRESSURE: 75 MMHG

## 2022-03-03 DIAGNOSIS — S60.221A CONTUSION OF RIGHT HAND, INITIAL ENCOUNTER: Primary | ICD-10-CM

## 2022-03-03 PROCEDURE — 99283 EMERGENCY DEPT VISIT LOW MDM: CPT

## 2022-03-03 PROCEDURE — 73130 X-RAY EXAM OF HAND: CPT

## 2022-03-03 PROCEDURE — 73110 X-RAY EXAM OF WRIST: CPT

## 2022-03-03 ASSESSMENT — ENCOUNTER SYMPTOMS
FACIAL SWELLING: 0
CONSTIPATION: 0
BLOOD IN STOOL: 0
DIARRHEA: 0
SINUS PRESSURE: 0
ABDOMINAL PAIN: 0
COLOR CHANGE: 0
EYE DISCHARGE: 0
RHINORRHEA: 0
CHEST TIGHTNESS: 0
VOMITING: 0
NAUSEA: 0
WHEEZING: 0
BACK PAIN: 0
COUGH: 0
SORE THROAT: 0
TROUBLE SWALLOWING: 0
EYE PAIN: 0
EYE REDNESS: 0
SHORTNESS OF BREATH: 0

## 2022-03-03 ASSESSMENT — PAIN SCALES - GENERAL: PAINLEVEL_OUTOF10: 7

## 2022-03-03 NOTE — ED NOTES

## 2022-03-03 NOTE — Clinical Note
Lobito Godinez was seen and treated in our emergency department on 3/3/2022. He may return to work on 03/04/2022. If you have any questions or concerns, please don't hesitate to call.       Mello Barajas MD

## 2022-03-03 NOTE — ED PROVIDER NOTES
[address]  eMERGENCY dEPARTMENT eNCOUnter      Pt Name: Rohan Anderson  MRN: [id number]  [place] 1990  Date of evaluation: 3/3/22      CHIEF COMPLAINT       Chief Complaint   Patient presents with    Hand Pain     Rt hand/wrist pain s/p fall    Wrist Pain     Rt hand/wrist pain s/p fall         HISTORY OF PRESENT ILLNESS    Rohan Anderson is a 33 y.o. male who presents complaining of wrist injury    Location/Symptom: Pain and swelling to the right wrist  Timing/Onset: 1 hour ago  Context/Setting: Patient was rushing try to get to work ended up falling down the stairs and tried to grab himself by the banister and twisted or felt wrong. Patient put on his brace that he has not tried to go to work and was going to get seen after work with a steady man to be evaluated. Quality: Throbbing  Duration: Constant  Modifying Factors: Movement  Severity: Moderate to severe      REVIEW OF SYSTEMS       Review of Systems   Constitutional: Negative for activity change, appetite change, chills, diaphoresis and fever. HENT: Negative for congestion, ear pain, facial swelling, nosebleeds, rhinorrhea, sinus pressure, sore throat and trouble swallowing. Eyes: Negative for pain, discharge and redness. Respiratory: Negative for cough, chest tightness, shortness of breath and wheezing. Cardiovascular: Negative for chest pain, palpitations and leg swelling. Gastrointestinal: Negative for abdominal pain, blood in stool, constipation, diarrhea, nausea and vomiting. Genitourinary: Negative for difficulty urinating, dysuria, flank pain, frequency, genital sores and hematuria. Musculoskeletal: Negative for arthralgias, back pain, gait problem, joint swelling, myalgias and neck pain. Fall with injury to the right hand   Skin: Negative for color change, pallor, rash and wound. Neurological: Negative for dizziness, tremors, seizures, syncope, speech difficulty, weakness, numbness and headaches. Psychiatric/Behavioral: Negative for confusion, decreased concentration, hallucinations, self-injury, sleep disturbance and suicidal ideas. PAST MEDICAL HISTORY     Past Medical History:   Diagnosis Date    Headache(784.0)     Syncope        SURGICAL HISTORY       Past Surgical History:   Procedure Laterality Date    APPENDECTOMY      HERNIA REPAIR Right     Rt inguinal hernia repair       CURRENT MEDICATIONS       Current Discharge Medication List      CONTINUE these medications which have NOT CHANGED    Details   diclofenac (VOLTAREN) 50 MG EC tablet Take 1 tablet by mouth 3 times daily (with meals)  Qty: 60 tablet, Refills: 3    Associated Diagnoses: Closed nondisplaced fracture of scaphoid of right wrist, unspecified portion of scaphoid, sequela; Slac (scapholunate advanced collapse) of wrist, right      !! ibuprofen (IBU) 600 MG tablet Take 1 tablet by mouth every 6 hours as needed for Pain  Qty: 30 tablet, Refills: 0      ondansetron (ZOFRAN ODT) 4 MG disintegrating tablet Take 1 tablet by mouth every 8 hours as needed for Nausea  Qty: 20 tablet, Refills: 0      dicyclomine (BENTYL) 10 MG capsule Take 1 capsule by mouth every 6 hours as needed (cramps)  Qty: 20 capsule, Refills: 0      acetaminophen (TYLENOL) 325 MG tablet Take 2 tablets by mouth every 8 hours as needed for Pain  Qty: 30 tablet, Refills: 0    Associated Diagnoses: Neck pain      !! ibuprofen (ADVIL;MOTRIN) 600 MG tablet Take 1 tablet by mouth every 6 hours as needed for Pain  Qty: 30 tablet, Refills: 0    Associated Diagnoses: Neck pain       !! - Potential duplicate medications found. Please discuss with provider. ALLERGIES     has No Known Allergies. SOCIAL HISTORY      reports that he has been smoking. He has a 10.00 pack-year smoking history. He has never used smokeless tobacco. He reports current alcohol use. He reports current drug use. Drug: Marijuana [place]).     PHYSICAL EXAM     INITIAL VITALS: /75 Pulse 70   Resp 16   Ht 6' (1.829 m)   Wt 160 lb (72.6 kg)   SpO2 98%   BMI 21.70 kg/m²      Physical Exam  Vitals and nursing note reviewed. Constitutional:       General: He is not in acute distress. Appearance: He is well-developed. He is not diaphoretic. HENT:      Head: Normocephalic and atraumatic. Eyes:      General: No scleral icterus. Right eye: No discharge. Left eye: No discharge. Conjunctiva/sclera: Conjunctivae normal.      Pupils: Pupils are equal, round, and reactive to light. Pulmonary:      Effort: Pulmonary effort is normal. No respiratory distress. Musculoskeletal:      Right wrist: Swelling, tenderness, bony tenderness, snuff box tenderness and crepitus present. No deformity, effusion or lacerations. Decreased range of motion. Normal pulse. Right hand: Swelling, tenderness and bony tenderness present. No deformity or lacerations. Decreased range of motion. Normal strength. Normal sensation. There is no disruption of two-point discrimination. Normal capillary refill. Normal pulse. Skin:     General: Skin is warm and dry. Coloration: Skin is not pale. Findings: No erythema or rash. Neurological:      Mental Status: He is alert and oriented to person, place, and time. Psychiatric:         Behavior: Behavior normal.         Thought Content: Thought content normal.         Judgment: Judgment normal.         DIAGNOSTIC RESULTS     RADIOLOGY:All plain film, CT,MRI, and formal ultrasound images (except ED bedside ultrasound) are read by the radiologist and the interpretations are directly viewed by the emergency physician.    XR WRIST RIGHT (MIN 3 VIEWS)    Result Date: 3/3/2022  EXAMINATION: THREE XRAY VIEWS OF THE RIGHT HAND; 4 XRAY VIEWS OF THE RIGHT WRIST 3/3/2022 7:37 am COMPARISON: Right wrist plain radiographs from 05/17/2010 HISTORY: ORDERING SYSTEM PROVIDED HISTORY: Fall, pain TECHNOLOGIST PROVIDED HISTORY: Fall, pain Reason for Exam: Fall, pain h/o scaphoid fracture. Possible injury to 5th metacarpal years ago. 64-year-old male with pain and fall; history of scaphoid fracture; possible 5th metacarpal injury FINDINGS: Right wrist: Nonunited scaphoid waist fracture. 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 Mild degenerative changes of the 1st CMC and triscaphe joints. No chondrocalcinosis. No acute fracture or dislocation. No soft tissue swelling. Right hand: Nonunited scaphoid waist fracture with sclerosis of the proximal pole of the scaphoid consistent with AVN. Mild degenerative changes of the 1st CMC and triscaphe joints. No chondrocalcinosis. Remote healed 5th metacarpal fracture. No marginal erosions. No soft tissue swelling. Right wrist: 1. Nonunited scaphoid waist fracture with proximal pole scaphoid AVN. 2. Mild osteoarthrosis. No acute fracture or dislocation. Right hand: 1. Nonunited scaphoid waist fracture with proximal pole scaphoid AVN. 2. Mild osteoarthrosis. No acute fracture or dislocation. XR HAND RIGHT (MIN 3 VIEWS)    Result Date: 3/3/2022  EXAMINATION: THREE XRAY VIEWS OF THE RIGHT HAND; 4 XRAY VIEWS OF THE RIGHT WRIST 3/3/2022 7:37 am COMPARISON: Right wrist plain radiographs from 05/17/2010 HISTORY: ORDERING SYSTEM PROVIDED HISTORY: Fall, pain TECHNOLOGIST PROVIDED HISTORY: Fall, pain Reason for Exam: Fall, pain h/o scaphoid fracture. Possible injury to 5th metacarpal years ago. 64-year-old male with pain and fall; history of scaphoid fracture; possible 5th metacarpal injury FINDINGS: Right wrist: Nonunited scaphoid waist fracture. 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 Mild degenerative changes of the 1st CMC and triscaphe joints. No chondrocalcinosis. No acute fracture or dislocation. No soft tissue swelling. Right hand: Nonunited scaphoid waist fracture with sclerosis of the proximal pole of the scaphoid consistent with AVN.   Mild degenerative changes of the 1st [facility] and triscaphe joints. No chondrocalcinosis. Remote healed 5th metacarpal fracture. No marginal erosions. No soft tissue swelling. Right wrist: 1. Nonunited scaphoid waist fracture with proximal pole scaphoid AVN. 2. Mild osteoarthrosis. No acute fracture or dislocation. Right hand: 1. Nonunited scaphoid waist fracture with proximal pole scaphoid AVN. 2. Mild osteoarthrosis. No acute fracture or dislocation. LABS: All lab results were reviewed by myself, and all abnormals are listed below. Labs Reviewed - No data to display      MEDICAL DECISION MAKING:     There is some swelling right around the snuffbox and radial wrist area with limited range of motion and what felt like crepitus when I tried to flex his wrist. We will go ahead and get x-rays. EMERGENCY DEPARTMENT COURSE:   Vitals:    Vitals:    03/03/22 0736   BP: 110/75   Pulse: 70   Resp: 16   SpO2: 98%   Weight: 160 lb (72.6 kg)   Height: 6' (1.829 m)       The patient was given the following medications while in the emergency department:  No orders of the defined types were placed in this encounter. -------------------------  8:18 AM EST  Patient was updated on results. Told him to go ahead and continue wearing his splint that he already has and follow-up with his orthopedic surgeon to make sure that he heals appropriately. CONSULTS:  None    PROCEDURES:  None    FINAL IMPRESSION      1.  Contusion of right hand, initial encounter          DISPOSITION/PLAN   DISPOSITION Decision To Discharge 03/03/2022 08:17:25 AM      PATIENT REFERREDTO:  [facility] ED  [address] [phone]    If symptoms worsen      DISCHARGEMEDICATIONS:  Current Discharge Medication List          (Please note that portions of this note were completed with a voice recognition program.  Efforts were made to edit thedictations but occasionally words are mis-transcribed.)    Yo Morales, MD  Attending Emergency Physician                        Colvin Lombard, MD  03/03/22 0632

## 2022-09-03 ENCOUNTER — HOSPITAL ENCOUNTER (EMERGENCY)
Age: 32
Discharge: HOME | End: 2022-09-03
Attending: EMERGENCY MEDICINE
Payer: COMMERCIAL

## 2022-09-03 VITALS
DIASTOLIC BLOOD PRESSURE: 64 MMHG | TEMPERATURE: 98.1 F | SYSTOLIC BLOOD PRESSURE: 116 MMHG | OXYGEN SATURATION: 98 % | RESPIRATION RATE: 18 BRPM | WEIGHT: 165 LBS | BODY MASS INDEX: 22.35 KG/M2 | HEIGHT: 72 IN | HEART RATE: 64 BPM

## 2022-09-03 DIAGNOSIS — S63.501A SPRAIN OF RIGHT WRIST, INITIAL ENCOUNTER: Primary | ICD-10-CM

## 2022-09-03 PROCEDURE — 99282 EMERGENCY DEPT VISIT SF MDM: CPT

## 2022-09-03 ASSESSMENT — PAIN DESCRIPTION - PAIN TYPE: TYPE: ACUTE PAIN

## 2022-09-03 ASSESSMENT — LIFESTYLE VARIABLES
HOW OFTEN DO YOU HAVE A DRINK CONTAINING ALCOHOL: NEVER
HOW MANY STANDARD DRINKS CONTAINING ALCOHOL DO YOU HAVE ON A TYPICAL DAY: PATIENT DOES NOT DRINK

## 2022-09-03 ASSESSMENT — PAIN DESCRIPTION - ORIENTATION: ORIENTATION: RIGHT

## 2022-09-03 ASSESSMENT — ENCOUNTER SYMPTOMS
VOMITING: 0
COUGH: 0
BACK PAIN: 0
CONSTIPATION: 0
ABDOMINAL PAIN: 0
SHORTNESS OF BREATH: 0
SORE THROAT: 0
NAUSEA: 0
EYE PAIN: 0
CHEST TIGHTNESS: 0
DIARRHEA: 0

## 2022-09-03 ASSESSMENT — PAIN DESCRIPTION - LOCATION: LOCATION: WRIST

## 2022-09-03 ASSESSMENT — PAIN - FUNCTIONAL ASSESSMENT: PAIN_FUNCTIONAL_ASSESSMENT: 0-10

## 2022-09-03 ASSESSMENT — PAIN SCALES - GENERAL: PAINLEVEL_OUTOF10: 6

## 2022-09-03 ASSESSMENT — PAIN DESCRIPTION - DESCRIPTORS: DESCRIPTORS: TIGHTNESS;BURNING

## 2022-09-03 NOTE — Clinical Note
Aayush Alcala was seen and treated in our emergency department on 9/3/2022. He may return to work on 09/04/2022. If you have any questions or concerns, please don't hesitate to call.       Amy Ward MD

## 2022-09-03 NOTE — Clinical Note
Ashley Quinteros was seen and treated in our emergency department on 9/3/2022. He may return to work on 09/04/2022. If you have any questions or concerns, please don't hesitate to call.       Eloy Wylie MD

## 2022-09-03 NOTE — ED PROVIDER NOTES
[address]  eMERGENCY dEPARTMENT eNCOUnter    Pt Name: Jesús King  MRN: [id number]  [place] 1990  Date of evaluation: 9/3/22  CHIEF COMPLAINT       Chief Complaint   Patient presents with    Wrist Injury     HISTORY OF PRESENT ILLNESS   HPI  Patient was moving a couch at home yesterday evening when he injured his left wrist. Work today sent him in to get checked out. 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 REVIEW OF SYSTEMS     Review of Systems   Constitutional:  Negative for chills and fever. HENT:  Negative for congestion, ear pain and sore throat. Eyes:  Negative for pain and visual disturbance. Respiratory:  Negative for cough, chest tightness and shortness of breath. Cardiovascular:  Negative for chest pain and palpitations. Gastrointestinal:  Negative for abdominal pain, constipation, diarrhea, nausea and vomiting. Genitourinary:  Negative for dysuria and testicular pain. Musculoskeletal:  Positive for arthralgias (right wrist.). Negative for back pain. Skin:  Negative for rash and wound. Neurological:  Negative for seizures, syncope and headaches.    PASTMEDICAL HISTORY     Past Medical History:   Diagnosis Date    Headache(784.0)     Syncope      SURGICAL HISTORY       Past Surgical History:   Procedure Laterality Date    APPENDECTOMY      HERNIA REPAIR Right     Rt inguinal hernia repair     CURRENT MEDICATIONS       Discharge Medication List as of 9/3/2022  1:58 PM        CONTINUE these medications which have NOT CHANGED    Details   diclofenac (VOLTAREN) 50 MG EC tablet Take 1 tablet by mouth 3 times daily (with meals), Disp-60 tablet, R-3Normal      !! ibuprofen (IBU) 600 MG tablet Take 1 tablet by mouth every 6 hours as needed for Pain, Disp-30 tablet, R-0Print      ondansetron (ZOFRAN ODT) 4 MG disintegrating tablet Take 1 tablet by mouth every 8 hours as needed for Nausea, Disp-20 tablet, R-0Print      dicyclomine (BENTYL) 10 MG capsule Take 1 capsule by mouth every 6 hours as needed (cramps), Disp-20 capsule, R-0Print      acetaminophen (TYLENOL) 325 MG tablet Take 2 tablets by mouth every 8 hours as needed for Pain, Disp-30 tablet, R-0Print      !! ibuprofen (ADVIL;MOTRIN) 600 MG tablet Take 1 tablet by mouth every 6 hours as needed for Pain, Disp-30 tablet, R-0Print       !! - Potential duplicate medications found. Please discuss with provider. ALLERGIES     has No Known Allergies. FAMILY HISTORY     He indicated that the status of his mother is unknown. He indicated that the status of his father is unknown. He indicated that the status of his maternal grandmother is unknown. He indicated that the status of his maternal grandfather is unknown. He indicated that the status of his paternal grandmother is unknown. He indicated that the status of his paternal grandfather is unknown. SOCIALHISTORY      reports that he has been smoking. He has a 10.00 pack-year smoking history. He has never used smokeless tobacco. He reports current alcohol use. He reports current drug use. Drug: Marijuana [place]). PHYSICAL EXAM     INITIAL VITALS: /64   Pulse 64   Temp 98.1 °F (36.7 °C) (Oral)   Resp 18   Ht 6' (1.829 m)   Wt 165 lb (74.8 kg)   SpO2 98%   BMI 22.38 kg/m²    Physical Exam  Vitals and nursing note reviewed. Constitutional:       Appearance: He is well-developed. HENT:      Head: Normocephalic and atraumatic. Right Ear: External ear normal.      Left Ear: External ear normal.   Eyes:      Conjunctiva/sclera: Conjunctivae normal.      Pupils: Pupils are equal, round, and reactive to light. Neck:      Vascular: No JVD. Trachea: No tracheal deviation. Cardiovascular:      Rate and Rhythm: Normal rate and regular rhythm. Heart sounds: Normal heart sounds. Pulmonary:      Effort: Pulmonary effort is normal. No respiratory distress. Breath sounds: Normal breath sounds. No stridor.    Abdominal: General: Bowel sounds are normal. There is no distension. Palpations: Abdomen is soft. Tenderness: There is no abdominal tenderness. Musculoskeletal:      Cervical back: Normal range of motion and neck supple. Comments: Right wrist has normal ROM, no swelling. Mild tenderness over palmar surface of wrist joint line. Skin:     General: Skin is warm and dry. Neurological:      Mental Status: He is alert and oriented to person, place, and time. Cranial Nerves: No cranial nerve deficit. Coordination: Coordination normal.       MEDICAL DECISION MAKING:   Patient presenting with wrist pain after moving a sofa. No real trauma. Mild tenderness on exam with normal ROM. Very low suspicion for fracture, suspect wrist strain, day off work and establish with PCP for f/up. Procedures    DIAGNOSTIC RESULTS   EKG: All EKG's are interpreted by the Emergency Department Physician who either signs or Co-signs this chart inthe absence of a cardiologist.      RADIOLOGY:All plain film, CT, MRI, and formal ultrasound images (except ED bedside ultrasound) are read by the radiologist, see reports below, unless otherwise noted in MDM or here. No orders to display     LABS: All lab results were reviewed by myself, and all abnormals are listed below. Labs Reviewed - No data to display  EMERGENCY DEPARTMENT COURSE:   Vitals:    Vitals:    09/03/22 1250   BP: 116/64   Pulse: 64   Resp: 18   Temp: 98.1 °F (36.7 °C)   TempSrc: Oral   SpO2: 98%   Weight: 165 lb (74.8 kg)   Height: 6' (1.829 m)       The patient was given the following medications while in the emergency department:  No orders of the defined types were placed in this encounter. CONSULTS:  None    FINAL IMPRESSION      1.  Sprain of right wrist, initial encounter          DISPOSITION/PLAN   DISPOSITION Decision To Discharge 09/03/2022 01:03:26 PM      PATIENT REFERRED TO:  [facility]  [address]  [place] [address]  [phone]  Schedule an appointment as soon as possible for a visit     DISCHARGE MEDICATIONS:  Discharge Medication List as of 9/3/2022  1:58 PM        Shanel Ernandez MD  AttendingEmerB[facility] Physician                        Pantera Obando MD  09/03/22 [id number]

## 2022-09-03 NOTE — ED NOTES
Mode of arrival (squad #, walk in, police, etc) : walk in        Chief complaint(s): wrist injury        Arrival Note (brief scenario, treatment PTA, etc). : Pt arrives to the ED with the complaint of wrist pain. Pt states he was moving furniture around at home and pulled his wrist. Pt has some swelling in the right wrist but has movement and PMS intact. Pt states it feels tight and burns. C= \"Have you ever felt that you should Cut down on your drinking? \"  No  A= \"Have people Annoyed you by criticizing your drinking? \"  No  G= \"Have you ever felt bad or Guilty about your drinking? \"  No  E= \"Have you ever had a drink as an Eye-opener first thing in the morning to steady your nerves or to help a hangover? \"  No      Deferred []      Reason for deferring: N/A    *If yes to two or more: probable alcohol abuse. Rubio Townsend RN  09/03/22 [id number]

## 2022-12-05 ENCOUNTER — HOSPITAL ENCOUNTER (EMERGENCY)
Age: 32
Discharge: HOME | End: 2022-12-05
Attending: EMERGENCY MEDICINE
Payer: COMMERCIAL

## 2022-12-05 VITALS
WEIGHT: 155 LBS | DIASTOLIC BLOOD PRESSURE: 63 MMHG | RESPIRATION RATE: 18 BRPM | HEIGHT: 72 IN | TEMPERATURE: 97.7 F | HEART RATE: 82 BPM | OXYGEN SATURATION: 100 % | BODY MASS INDEX: 20.99 KG/M2 | SYSTOLIC BLOOD PRESSURE: 96 MMHG

## 2022-12-05 DIAGNOSIS — U07.1 COVID-19: Primary | ICD-10-CM

## 2022-12-05 LAB
INFLUENZA A: NOT DETECTED
INFLUENZA B: NOT DETECTED
SARS-COV-2 RNA, RT PCR: DETECTED
SOURCE: ABNORMAL
SPECIMEN DESCRIPTION: ABNORMAL

## 2022-12-05 PROCEDURE — 99283 EMERGENCY DEPT VISIT LOW MDM: CPT

## 2022-12-05 PROCEDURE — 87636 SARSCOV2 & INF A&B AMP PRB: CPT

## 2022-12-05 PROCEDURE — 6370000000 HC RX 637 (ALT 250 FOR IP): Performed by: PHYSICIAN ASSISTANT

## 2022-12-05 RX ORDER — IBUPROFEN 600 MG/1
600 TABLET ORAL ONCE
Status: COMPLETED | OUTPATIENT
Start: 2022-12-05 | End: 2022-12-05

## 2022-12-05 RX ORDER — ONDANSETRON 4 MG/1
4 TABLET, ORALLY DISINTEGRATING ORAL 3 TIMES DAILY PRN
Qty: 15 TABLET | Refills: 0 | Status: SHIPPED | OUTPATIENT
Start: 2022-12-05

## 2022-12-05 RX ORDER — IBUPROFEN 600 MG/1
600 TABLET ORAL EVERY 6 HOURS PRN
Qty: 30 TABLET | Refills: 0 | Status: SHIPPED | OUTPATIENT
Start: 2022-12-05

## 2022-12-05 RX ADMIN — IBUPROFEN 600 MG: 600 TABLET, FILM COATED ORAL at 14:34

## 2022-12-05 ASSESSMENT — PAIN - FUNCTIONAL ASSESSMENT: PAIN_FUNCTIONAL_ASSESSMENT: 0-10

## 2022-12-05 ASSESSMENT — PAIN SCALES - GENERAL: PAINLEVEL_OUTOF10: 6

## 2022-12-05 ASSESSMENT — LIFESTYLE VARIABLES
HOW MANY STANDARD DRINKS CONTAINING ALCOHOL DO YOU HAVE ON A TYPICAL DAY: PATIENT DOES NOT DRINK
HOW OFTEN DO YOU HAVE A DRINK CONTAINING ALCOHOL: NEVER

## 2022-12-05 NOTE — ED PROVIDER NOTES
[address]  eMERGENCY dEPARTMENT eNCOUnter      Pt Name: Fredy Peralta  MRN: [id number]  [place] 1990  Date of evaluation: 12/5/2022  Provider: Charmel Brittle, PA-C    CHIEF COMPLAINT       Chief Complaint   Patient presents with    Chills     pT states he awoke today with fever/chills and body aches, denies sick contacts  Tylenol approx 1 hour PTA           HISTORY OF PRESENT ILLNESS  (Location/Symptom, Timing/Onset, Context/Setting, Quality, Duration, Modifying Factors, Severity.)   Fredy Peralta is a 27 y.o. male who presents to the emergency department for evaluation of chills, cough, nausea, headache, congestion x early this morning. He has not checked temperature. Cough is productive with yellow phlegm. He denies chest pain, sob, emesis, abd pain, sore throat. He does smoke. No other complaints. Nursing Notes were reviewed. REVIEW OF SYSTEMS    (2-9 systems for level 4, 10 or more for level 5)     Review of Systems   chills, cough, nausea, headache, congestion    Except as noted above the remainder of the review of systems was reviewed and negative.        PAST MEDICAL HISTORY     Past Medical History:   Diagnosis Date    Headache(784.0)     Syncope      None otherwise stated in nurses notes    SURGICAL HISTORY       Past Surgical History:   Procedure Laterality Date    APPENDECTOMY      HERNIA REPAIR Right     Rt inguinal hernia repair     None otherwise stated in nurses notes    CURRENT MEDICATIONS       Discharge Medication List as of 12/5/2022  3:01 PM        CONTINUE these medications which have NOT CHANGED    Details   diclofenac (VOLTAREN) 50 MG EC tablet Take 1 tablet by mouth 3 times daily (with meals), Disp-60 tablet, R-3Normal      dicyclomine (BENTYL) 10 MG capsule Take 1 capsule by mouth every 6 hours as needed (cramps), Disp-20 capsule, R-0Print      acetaminophen (TYLENOL) 325 MG tablet Take 2 tablets by mouth every 8 hours as needed for Pain, Disp-30 tablet, R-0Print             ALLERGIES     Patient has no known allergies. FAMILY HISTORY           Problem Relation Age of Onset    Seizures Mother     Thyroid Cancer Mother     Anxiety Disorder Father     Diabetes Maternal Grandmother     Diabetes Maternal Grandfather     Heart Disease Maternal Grandfather     Arthritis Maternal Grandfather     Diabetes Paternal Grandmother     Diabetes Paternal Grandfather      Family Status   Relation Name Status    Mother  (Not Specified)    Father  (Not Specified)    MGM  (Not Specified)    MGF  (Not Specified)    PGM  (Not Specified)    PGF  (Not Specified)      None otherwise stated in nurses notes    SOCIAL HISTORY      reports that he has been smoking. He has a 10.00 pack-year smoking history. He has never used smokeless tobacco. He reports current alcohol use. He reports current drug use. Drug: Marijuana Sable [place]). lives at home with others     PHYSICAL EXAM    (up to 7 for level 4, 8 or more for level 5)     ED Triage Vitals [12/05/22 1357]   BP Temp Temp Source Heart Rate Resp SpO2 Height Weight   96/63 97.7 °F (36.5 °C) Oral 82 18 100 % 6' (1.829 m) 155 lb (70.3 kg)       Physical Exam   Nursing note and vitals reviewed. Constitutional: Oriented to person, place, and time and well-developed, well-nourished. Head: Normocephalic and atraumatic. Ear: External ears normal. Tm non-erythematous bilaterally with no canal erythema or swelling. Nose: Nose normal and midline. Congestion noted. Eyes: Conjunctivae and EOM are normal. Pupils are equal, round, and reactive to light. Neck: Normal range of motion. Neck supple. Throat: Posterior pharynx is without erythema or exudates, airway is patent, no swelling  Cardiovascular: Normal rate, regular rhythm, normal heart sounds and intact distal pulses. Pulmonary/Chest: Effort normal and breath sounds normal. No respiratory distress. No wheezes. No rales. No chest tenderness. Abdominal: Soft. No distension and no mass. There is no tenderness. There is no rebound and no guarding. Musculoskeletal: Normal range of motion. Neurological: Alert and oriented to person, place, and time. GCS score is 15. Skin: Skin is warm and dry. No rash noted. No erythema. No pallor. Psychiatric: Mood, memory, affect and judgment normal.           DIAGNOSTIC RESULTS     EKG: All EKG's are interpreted by the Emergency Department Physician who either signs or Co-signs this chart in the absence of a cardiologist.        RADIOLOGY:   All plain film, CT, MRI, and formal ultrasound images (except ED bedside ultrasound) are read by the radiologist, see reports below, unless otherwise noted in MDM or here. No orders to display       No results found. LABS:  Labs Reviewed   COVID-19 & INFLUENZA COMBO - Abnormal; Notable for the following components:       Result Value    SARS-CoV-2 RNA, RT PCR DETECTED (*)     All other components within normal limits       All other labs were within normal range or not returned as of this dictation.     EMERGENCY DEPARTMENT COURSE and DIFFERENTIAL DIAGNOSIS/MDM:   Vitals:    Vitals:    12/05/22 1357   BP: 96/63   Pulse: 82   Resp: 18   Temp: 97.7 °F (36.5 °C)   TempSrc: Oral   SpO2: 100%   Weight: 155 lb (70.3 kg)   Height: 6' (1.829 m)         Patient instructed to return to the emergency room if symptoms worsen, return, or any other concern right away which is agreed by the patient    ED MEDS:  Orders Placed This Encounter   Medications    ibuprofen (ADVIL;MOTRIN) tablet 600 mg    ondansetron (ZOFRAN-ODT) 4 MG disintegrating tablet     Sig: Place 1 tablet under the tongue 3 times daily as needed for Nausea or Vomiting     Dispense:  15 tablet     Refill:  0    ibuprofen (IBU) 600 MG tablet     Sig: Take 1 tablet by mouth every 6 hours as needed for Pain     Dispense:  30 tablet     Refill:  0         CONSULTS:  None    PROCEDURES:  None      FINAL IMPRESSION      1. COVID-19          DISPOSITION/PLAN DISPOSITION Decision To Discharge    PATIENT REFERRED TO:  [facility]  [facility] [place] [address]  [address] [id number] [phone]  Call       [address]  [address]  [address] [address]  [phone]        DISCHARGE MEDICATIONS:  Discharge Medication List as of 12/5/2022  3:01 PM            Summation      Patient Course:    URI symptoms. Began early this morning. Lungs are clear. No resp distress. VSS. Will check covid, flu. Pt Is positive for covid. He is stable, well appearing. Discussed results and plan with the pt. They expressed appropriate understanding. Pt given close follow up, supportive care instructions and strict return instructions at the bedside. The care is provided during an unprecedented national emergency due to the novel coronavirus, COVID-19. ED Medications administered this visit:    Medications   ibuprofen (ADVIL;MOTRIN) tablet 600 mg (600 mg Oral Given 12/5/22 1434)       New Prescriptions from this visit:    Discharge Medication List as of 12/5/2022  3:01 PM          Follow-up:  [facility] [address]  [address] [id number] [phone]  Call       [address]  [place] [address] [phone]          Final Impression:   1.  COVID-19               (Please note that portions of this note were completed with a voice recognition program )        ISIDRA Horne, [place]  12/05/22 1955

## 2022-12-05 NOTE — DISCHARGE INSTRUCTIONS
Please follow up with PCP. Recommend quarantine for 5 days. Return to the ED if symptoms change or worsen.

## 2022-12-05 NOTE — Clinical Note
Conan Alpers was seen and treated in our emergency department on 12/5/2022. He may return to work on 12/10/2022. If you have any questions or concerns, please don't hesitate to call.       Belgica Esquivel PA-C

## 2022-12-06 NOTE — ED PROVIDER NOTES
[address]  eMERGENCY dEPARTMENT eNCOUnter   Independent Attestation     Pt Name: Kendall Alfaro  MRN: [id number]  [place] 1990  Date of evaluation: 12/5/22   Kendall Alfaro is a 27 y.o. male who presents with Chills (pT states he awoke today with fever/chills and body aches, denies sick contacts/Tylenol approx 1 hour PTA)    Vitals:   Vitals:    12/05/22 1357   BP: 96/63   Pulse: 82   Resp: 18   Temp: 97.7 °F (36.5 °C)   TempSrc: Oral   SpO2: 100%   Weight: 155 lb (70.3 kg)   Height: 6' (1.829 m)     Impression:   1. COVID-19      I was personally available for consultation in the Emergency Department. I have reviewed the chart and agree with the documentation as recorded by the [facility], including the assessment, treatment plan and disposition.   Lex Conner MD  Attending Emergency  Physician                 Lex Conner MD  12/05/22 7241

## 2024-08-16 ENCOUNTER — HOSPITAL ENCOUNTER (EMERGENCY)
Age: 34
Discharge: HOME | End: 2024-08-16
Attending: EMERGENCY MEDICINE
Payer: COMMERCIAL

## 2024-08-16 VITALS
SYSTOLIC BLOOD PRESSURE: 131 MMHG | TEMPERATURE: 97.2 F | DIASTOLIC BLOOD PRESSURE: 88 MMHG | RESPIRATION RATE: 18 BRPM | HEART RATE: 64 BPM | OXYGEN SATURATION: 98 %

## 2024-08-16 DIAGNOSIS — K02.9 PAIN DUE TO DENTAL CARIES: Primary | ICD-10-CM

## 2024-08-16 DIAGNOSIS — M54.2 NECK PAIN: ICD-10-CM

## 2024-08-16 PROCEDURE — 6370000000 HC RX 637 (ALT 250 FOR IP)

## 2024-08-16 PROCEDURE — 99283 EMERGENCY DEPT VISIT LOW MDM: CPT

## 2024-08-16 RX ORDER — ACETAMINOPHEN 325 MG/1
650 TABLET ORAL EVERY 6 HOURS PRN
Qty: 30 TABLET | Refills: 0 | Status: SHIPPED | OUTPATIENT
Start: 2024-08-16

## 2024-08-16 RX ORDER — OXYCODONE HYDROCHLORIDE 5 MG/1
5 TABLET ORAL ONCE
Status: COMPLETED | OUTPATIENT
Start: 2024-08-16 | End: 2024-08-16

## 2024-08-16 RX ORDER — CHLORHEXIDINE GLUCONATE ORAL RINSE 1.2 MG/ML
15 SOLUTION DENTAL 2 TIMES DAILY
Qty: 420 ML | Refills: 0 | Status: SHIPPED | OUTPATIENT
Start: 2024-08-16 | End: 2024-08-30

## 2024-08-16 RX ORDER — PENICILLIN V POTASSIUM 500 MG/1
500 TABLET ORAL 4 TIMES DAILY
Qty: 28 TABLET | Refills: 0 | Status: SHIPPED | OUTPATIENT
Start: 2024-08-16 | End: 2024-08-23

## 2024-08-16 RX ORDER — IBUPROFEN 800 MG/1
800 TABLET ORAL EVERY 8 HOURS PRN
Qty: 30 TABLET | Refills: 0 | Status: SHIPPED | OUTPATIENT
Start: 2024-08-16

## 2024-08-16 RX ORDER — KETOROLAC TROMETHAMINE 10 MG/1
10 TABLET, FILM COATED ORAL EVERY 6 HOURS PRN
Qty: 6 TABLET | Refills: 0 | Status: SHIPPED | OUTPATIENT
Start: 2024-08-16 | End: 2024-08-16

## 2024-08-16 RX ORDER — PENICILLIN V POTASSIUM 250 MG/1
500 TABLET ORAL ONCE
Status: COMPLETED | OUTPATIENT
Start: 2024-08-16 | End: 2024-08-16

## 2024-08-16 RX ADMIN — OXYCODONE HYDROCHLORIDE 5 MG: 5 TABLET ORAL at 02:56

## 2024-08-16 RX ADMIN — PENICILLIN V POTASSIUM 500 MG: 250 TABLET, FILM COATED ORAL at 02:56

## 2024-08-16 RX ADMIN — BENZOCAINE 1 EACH: 220 GEL, DENTIFRICE DENTAL at 02:56

## 2024-08-16 ASSESSMENT — PAIN - FUNCTIONAL ASSESSMENT
PAIN_FUNCTIONAL_ASSESSMENT: PREVENTS OR INTERFERES SOME ACTIVE ACTIVITIES AND ADLS
PAIN_FUNCTIONAL_ASSESSMENT: 0-10

## 2024-08-16 ASSESSMENT — ENCOUNTER SYMPTOMS
TROUBLE SWALLOWING: 0
FACIAL SWELLING: 0
SORE THROAT: 0
SHORTNESS OF BREATH: 0

## 2024-08-16 ASSESSMENT — PAIN DESCRIPTION - LOCATION: LOCATION: MOUTH

## 2024-08-16 ASSESSMENT — PAIN DESCRIPTION - ORIENTATION: ORIENTATION: RIGHT

## 2024-08-16 ASSESSMENT — PAIN SCALES - GENERAL: PAINLEVEL_OUTOF10: 9

## 2024-08-16 ASSESSMENT — PAIN DESCRIPTION - DESCRIPTORS: DESCRIPTORS: CRUSHING

## 2024-08-16 NOTE — DISCHARGE INSTRUCTIONS
You were seen in the emergency department for dental pain.  Your vital signs were stable.  No fever noted.  No dental abscesses.  We gave you your first dose of antibiotics and pain meds in the emergency department.    I have sent the rest of the antibiotics to the pharmacy.  Please take these as prescribed.  I have also sent Motrin and Tylenol to the pharmacy as well.  Please be sure to eat something when taking these medications as they can be hard on your stomach.    I have provided you with a list of dental clinics.  You may try them to see if they can get you in to see a dentist as well.    Please follow-up with your primary care provider in 1 week given recent ER visit.  If you do not have a primary care provider, I have provided the contact information for [facility].  Please call and schedule appointment to establish care.    Please return the emergency department if your pain returns or worsens or if you develop any difficulty breathing, sore throat, fevers, or facial swelling.

## 2024-08-16 NOTE — ED PROVIDER NOTES
[facility] ED  Emergency Department Encounter  Emergency Medicine Resident     Pt Name:Lamin Harris  MRN: [id number]  Birthdate 1990  Date of evaluation: 8/16/24  PCP:  No primary care provider on file.  Note Started: 2:46 AM EDT      CHIEF COMPLAINT       Chief Complaint   Patient presents with    Dental Pain     R       HISTORY OF PRESENT ILLNESS  (Location/Symptom, Timing/Onset, Context/Setting, Quality, Duration, Modifying Factors, Severity.)      Lamin Harris is a 34 y.o. male who presents with right upper dental pain.  Patient states he has been having intermittent right upper dental pain for months.  The pain worsened again a couple days ago.  He did take some Tylenol and Motrin a couple hours ago before bed but has not been able to sleep due to the pain.  He denies any fevers.  He is still tolerating liquids.  He denies any difficulty swallowing or shortness of breath.  He does not have dental insurance so he has not seen a dentist.    PAST MEDICAL / SURGICAL / SOCIAL / FAMILY HISTORY      has a past medical history of Headache(784.0) and Syncope.     has a past surgical history that includes Appendectomy and hernia repair (Right).    Social History     Socioeconomic History    Marital status: Single     Spouse name: Not on file    Number of children: Not on file    Years of education: Not on file    Highest education level: Not on file   Occupational History    Not on file   Tobacco Use    Smoking status: Every Day     Current packs/day: 0.50     Average packs/day: 0.5 packs/day for 20.0 years (10.0 ttl pk-yrs)     Types: Cigarettes    Smokeless tobacco: Never   Substance and Sexual Activity    Alcohol use: Yes     Comment: social    Drug use: Yes     Types: Marijuana (Weed)    Sexual activity: Yes     Partners: Female   Other Topics Concern    Not on file   Social History Narrative    Not on file     Social Determinants of Health     Financial Resource Strain: Not on file   Food  medications    Details   penicillin v potassium (VEETID) 500 MG tablet Take 1 tablet by mouth 4 times daily for 7 days, Disp-28 tablet, R-0Normal      chlorhexidine (PERIDEX) 0.12 % solution Swish and spit 15 mLs 2 times daily for 14 days, Disp-420 mL, R-0Normal             David Hdz MD  Emergency Medicine Resident    (Please note that portions of this note were completed with a voice recognition program.  Efforts were made to edit the dictations but occasionally words are mis-transcribed.)

## 2024-08-16 NOTE — ED NOTES
Pt came to ed via triage with complaint of right sided dental pain. Pt states tried tylenol, ibuprofen, Orajel mouth rinse with minimal relief. Pain inhibited pt from sleeping. Does not follow with dentist. VSS, NAD, AOx4. Patient resting in bed, respirations even and unlabored. Call light in reach.

## 2024-08-24 NOTE — ED PROVIDER NOTES
[facility]   Emergency Department  Faculty Attestation       I performed a history and physical examination of the patient and discussed management with the resident. I reviewed the resident’s note and agree with the documented findings including all diagnostic interpretations and plan of care. Any areas of disagreement are noted on the chart. I was personally present for the key portions of any procedures. I have documented in the chart those procedures where I was not present during the key portions. I have reviewed the emergency nurses triage note. I agree with the chief complaint, past medical history, past surgical history, allergies, medications, social and family history as documented unless otherwise noted below.  For Physician Assistant/ Nurse Practitioner cases/documentation I have personally evaluated this patient and have completed at least one if not all key elements of the E/M (history, physical exam, and MDM). Additional findings are as noted.    Patient Name: Lamin Harris  MRN: [id number]  : 1990  Primary Care Physician: No primary care provider on file.    Date of evaluationa: 2024   Note Started: 3:10 AM EDT    Pertinent Comments     Chief Complaint:   Chief Complaint   Patient presents with    Dental Pain     R        Initial vitals: (If not listed, please see nursing documentation)  ED Triage Vitals [24 0249]   BP Systolic BP Percentile Diastolic BP Percentile Temp Temp Source Pulse Respirations SpO2   131/88 -- -- 97.2 °F (36.2 °C) Oral 64 18 98 %      Height Weight         -- --              HPI/PE/Impression:  This is a 34 y.o. male who presents to the Emergency Department intermittent right upper dental pain that has been going on for months but worsened within the last day or 2.  Has not been able to sleep due to the pain.  On exam he is awake alert answering questions, has poor dentition with dental caries on the left upper region.  Has no significant